# Patient Record
Sex: MALE | Race: BLACK OR AFRICAN AMERICAN
[De-identification: names, ages, dates, MRNs, and addresses within clinical notes are randomized per-mention and may not be internally consistent; named-entity substitution may affect disease eponyms.]

---

## 2018-06-03 ENCOUNTER — HOSPITAL ENCOUNTER (EMERGENCY)
Dept: HOSPITAL 97 - ER | Age: 48
Discharge: HOME | End: 2018-06-03
Payer: COMMERCIAL

## 2018-06-03 DIAGNOSIS — Z88.5: ICD-10-CM

## 2018-06-03 DIAGNOSIS — I10: ICD-10-CM

## 2018-06-03 DIAGNOSIS — G47.30: ICD-10-CM

## 2018-06-03 DIAGNOSIS — Z88.6: ICD-10-CM

## 2018-06-03 DIAGNOSIS — R10.30: Primary | ICD-10-CM

## 2018-06-03 LAB
ALBUMIN SERPL BCP-MCNC: 3.9 G/DL (ref 3.2–5.5)
ALP SERPL-CCNC: 83 IU/L (ref 42–121)
ALT SERPL W P-5'-P-CCNC: 23 IU/L (ref 10–60)
AST SERPL W P-5'-P-CCNC: 20 IU/L (ref 10–42)
BUN BLD-MCNC: 16 MG/DL (ref 6–20)
COHGB MFR BLDA: 0.6 % (ref 0–1.5)
GLUCOSE SERPLBLD-MCNC: 157 MG/DL (ref 65–120)
HCT VFR BLD CALC: 45.8 % (ref 39.6–49)
LIPASE SERPL-CCNC: 30 U/L (ref 22–51)
LYMPHOCYTES # SPEC AUTO: 2.4 K/UL (ref 0.7–4.9)
MCH RBC QN AUTO: 28 PG (ref 27–35)
MCV RBC: 91.3 FL (ref 80–100)
METHAMPHET UR QL SCN: NEGATIVE
OXYHGB MFR BLDA: 95 % (ref 94–97)
PMV BLD: 9.3 FL (ref 7.6–11.3)
POTASSIUM SERPL-SCNC: 4.7 MEQ/L (ref 3.6–5)
RBC # BLD: 5.02 M/UL (ref 4.33–5.43)
SAO2 % BLDA: 96.5 % (ref 92–98.5)
THC SERPL-MCNC: NEGATIVE NG/ML
UA COMPLETE W REFLEX CULTURE PNL UR: (no result)

## 2018-06-03 PROCEDURE — 80307 DRUG TEST PRSMV CHEM ANLYZR: CPT

## 2018-06-03 PROCEDURE — 82805 BLOOD GASES W/O2 SATURATION: CPT

## 2018-06-03 PROCEDURE — 93005 ELECTROCARDIOGRAM TRACING: CPT

## 2018-06-03 PROCEDURE — 82140 ASSAY OF AMMONIA: CPT

## 2018-06-03 PROCEDURE — 83690 ASSAY OF LIPASE: CPT

## 2018-06-03 PROCEDURE — 36415 COLL VENOUS BLD VENIPUNCTURE: CPT

## 2018-06-03 PROCEDURE — 96361 HYDRATE IV INFUSION ADD-ON: CPT

## 2018-06-03 PROCEDURE — 96360 HYDRATION IV INFUSION INIT: CPT

## 2018-06-03 PROCEDURE — 85025 COMPLETE CBC W/AUTO DIFF WBC: CPT

## 2018-06-03 PROCEDURE — 74176 CT ABD & PELVIS W/O CONTRAST: CPT

## 2018-06-03 PROCEDURE — 76377 3D RENDER W/INTRP POSTPROCES: CPT

## 2018-06-03 PROCEDURE — 80076 HEPATIC FUNCTION PANEL: CPT

## 2018-06-03 PROCEDURE — 81003 URINALYSIS AUTO W/O SCOPE: CPT

## 2018-06-03 PROCEDURE — 81015 MICROSCOPIC EXAM OF URINE: CPT

## 2018-06-03 PROCEDURE — 99285 EMERGENCY DEPT VISIT HI MDM: CPT

## 2018-06-03 PROCEDURE — 80048 BASIC METABOLIC PNL TOTAL CA: CPT

## 2018-06-03 NOTE — EDPHYS
Physician Documentation                                                                           

 [facility]                                                                

Name: Reji Miller                                                                       

Age: 47 yrs                                                                                       

Sex: Male                                                                                         

: 1970                                                                                   

MRN: [id number]                                                                                   

Arrival Date: 2018                                                                          

Time: 09:34                                                                                       

Account#: [id number]                                                                            

Bed 20                                                                                            

Private MD:                                                                                       

ED Physician Richard Fernandes                                                                       

Historical:                                                                                       

- Allergies:                                                                                      

                                                                                             

09:37 Morphine;                                                                               sv  

09:37 Dilaudid;                                                                               sv  

- PMHx:                                                                                           

09:37 Hypertension; Borderline DM; Depression; PE; High Cholesterol; GERD;                    sv  

- PSHx:                                                                                           

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  

                                                                                                  

- Immunization history:: Adult Immunizations up to date.                                          

- Social history:: Smoking status: Patient/guardian denies using tobacco.                         

- Ebola Screening: : No symptoms or risks identified at this time.                                

                                                                                                  

                                                                                                  

Exam:                                                                                             

17:00 ECG was reviewed by the Attending Physician.                                              

                                                                                                  

Vital Signs:                                                                                      

09:38 BP 94 / 42; Pulse 57; Resp 16; Temp 98.5; Pulse Ox 93% on R/A; Weight 148.78 kg; Height sv  

      6 ft. 0 in. (182.88 cm); Pain 8/10;                                                         

10:16 BP 97 / 67; Pulse 53; Resp 20; Pulse Ox 96% on 2 lpm NC;                                mh5 

11:45  / 70; Pulse 58; Resp 18; Pulse Ox 99% on 2 lpm NC;                               em  

13:16  / 67; Pulse 49; Resp 16; Pulse Ox 98% on 2 lpm NC; Pain 0/10;                    em  

14:15  / 67; Pulse 52; Resp 16; Pulse Ox 97% on R/A; Pain 0/10;                         em  

09:38 Body Mass Index 44.48 (148.78 kg, 182.88 cm)                                            sv  

09:38 Pt placed on O2 \T\ 2L per NC. O2 sat up to 96%.                                          sv  

                                                                                                  

MDM:                                                                                              

09:57 Patient medically screened.                                                               

14:15 Data reviewed: vital signs, nurses notes. ED course: dr rosario has seen and examined       

      wants pt discharged. pt normotensive, encouraged to see pcp and use cpap machine at         

      home.                                                                                       

                                                                                                  

                                                                                             

10:02 Order name: Basic Metabolic Panel                                                         

                                                                                             

10:02 Order name: CBC with Diff; Complete Time: 11:08                                           

                                                                                             

10:02 Order name: Hepatic Function                                                              

                                                                                             

10:02 Order name: Lipase; Complete Time: 11:08                                                  

                                                                                             

10:02 Order name: Urine Microscopic Only; Complete Time: 14:14                                  

                                                                                             

10:02 Order name: AMMONIA; Complete Time: 11:08                                                 

                                                                                             

10:02 Order name: ABG; Complete Time: 11:08                                                     

                                                                                             

10:02 Order name: Urine Drug Screen; Complete Time: 14:14                                       

                                                                                             

10:03 Order name: CT Stone Protocol; Complete Time: 11:08                                       

                                                                                             

10:03 Order name: Basic Metabolic Panel; Complete Time: 11:08                                 EDMS

                                                                                             

10:03 Order name: Liver (Hepatic) Function; Complete Time: 11:08                              EDMS

                                                                                             

13:49 Order name: Urine Dipstick--Ancillary (enter results)                                   em1 

                                                                                             

13:50 Order name: Urine Dipstick-Ancillary; Complete Time: 14:14                              EDMS

                                                                                             

10:02 Order name: IV Saline Lock; Complete Time: 10:14                                          

                                                                                             

10:02 Order name: Labs collected and sent; Complete Time: 10:14                                 

                                                                                             

10:02 Order name: Urine Dipstick-Ancillary (obtain specimen); Complete Time: 13:46            gs  

                                                                                                  

EC:00 Rate is 55 beats/min. Rhythm is regular. [place] interval is normal. QRS interval is normal. gs  

      QT interval is normal. T waves are Flattened. Clinical impression: NSR w/ Non-specific      

      ST/T Changes. Interpreted by me.                                                            

                                                                                                  

Administered Medications:                                                                         

10:14 Drug: NS 0.9% 1000 ml Route: IV; Rate: 1 bolus; Site: right antecubital;                ss  

13:15 Follow up: IV Status: Completed infusion; IV Intake: 1000ml                             em  

11:32 Drug: NS 0.9% 1000 ml Route: IV; Rate: 1 bolus; Site: right antecubital;                ss  

13:16 Follow up: IV Status: Completed infusion; IV Intake: 1000ml                             em  

13:54 Drug: NS 0.9% 1000 ml Route: IV; Rate: 1 bolus; Site: right antecubital;                em  

14:57 Follow up: IV Status: Completed infusion; IV Intake: 1000ml                             em  

                                                                                                  

                                                                                                  

Disposition:                                                                                      

18 14:17 Discharged to Home. Impression: Lower abdominal pain, unspecified, Sleep apnea.    

- Condition is Stable.                                                                            

- Discharge Instructions: Abdominal Pain, Adult.                                                  

                                                                                                  

- Medication Reconciliation Form, Thank You Letter, Antibiotic Education, Prescription            

  Opioid Use, Work release form form.                                                             

- Follow up: Private Physician; When: 1 - 2 days; Reason: Re-evaluation by your                   

  physician.                                                                                      

                                                                                                  

                                                                                                  

                                                                                                  

Addendum:                                                                                         

2018                                                                                        

     15:52 Addendum: CC: Flank Pain HPI:Onset 2 days ago worse persistent , located Right Flank    g
s

           Associated SS- nausea, weakness, very tired and sleepy. Nothing makes worse or better. 

           Has had in past. PMH: reviewed and agree Soc- ROS-all reviewed and negative PE- 

           gen-awake alert head - atraumatic, no mass Neck-supple no mass ENT- op moist no        

           erythema CV-s cassie no murmur P- lungs clear no increase WOB gi- soft nontender + CVA  

           tender Skin no rash Neuro - sleepy CN,motor,sensory intact and normal DDX-stone, pyleo,

           sleep apnea, medication reaction, encephalopathy. much better seen by medicine will    

           discharge instead of admit pt stable.                                                  

                                                                                                  

Signatures:                                                                                       

Dispatcher [employer]                           Natalia Pereyra RN                    RN   sv                                                   

Felipe Miller, LVN                       LVN  Raeann Llamas RN RN ss Starr, Gregory, MD MD                                                      

                                                                                                  

Corrections: (The following items were deleted from the chart)                                    

                                                                                             

14:15 12:17 Hospitalization Ordered by Fouzia Rosario MD for Inpatient Admission. Preliminary  gs  

      diagnosis is Hypotension due to drugs; Sleep apnea, unspecified. Bed requested for          

      Telemetry/MedSurg (Inpatient). Status is Inpatient Admission. Condition is Stable.          

      Problem is new. Symptoms have improved. UTI on Admission? No. gs                            

15:03 14:17 2018 14:17 Discharged to Home. Impression: Lower abdominal pain,            em  

      unspecified; Sleep apnea. Condition is Stable. Forms are Medication Reconciliation          

      Form, Thank You Letter, Antibiotic Education, Prescription Opioid Use. Follow up:           

      Private Physician; When: 1 - 2 days; Reason: Re-evaluation by your physician. gs            

                                                                                                  

**************************************************************************************************

## 2018-06-03 NOTE — RAD REPORT
EXAM DESCRIPTION:  CT - Stone Protocol - 6/3/2018 10:37 am

 

CLINICAL HISTORY:  Abdominal pain. Right lower quadrant pain for 5 days

 

COMPARISON:  None.

 

TECHNIQUE:  Computed axial tomography of the abdomen pelvis was obtained without oral or IV contrast.
 Lack of IV and oral contrast limits evaluation of solid organs, bowel, and vessels. Coronal reformat
drew images were obtained and reviewed.

 

All CT scans are performed using dose optimization technique as appropriate and may include automated
 exposure control or mA/KV adjustment according to patient size.

 

FINDINGS:  A renal calculus is not seen. An ureteral calculus is not noted. A bladder calculus is not
 present. A 28 millimeter low to intermediate density mass extends off of the upper pole of the right
 kidney. A small left renal cyst is suspected

 

Fatty infiltration liver is present. Mild gallbladder distention

 

Spleen, pancreas and adrenals appear grossly normal

 

There is no evidence of diverticulitis. The appendix appears normal

 

IMPRESSION:  Negative for a genitourinary calculus

 

28 millimeter low to intermediate density mass extending off of the right kidney does not have CT cri
teria for simple cyst. Ultrasound is recommended.

 

Mild gallbladder distention. Ultrasound would be helpful

## 2018-06-03 NOTE — ER
Nurse's Notes                                                                                     

 [facility]                                                                

Name: Reji Miller                                                                       

Age: 47 yrs                                                                                       

Sex: Male                                                                                         

: 1970                                                                                   

MRN: [id number]                                                                                   

Arrival Date: 2018                                                                          

Time: 09:34                                                                                       

Account#: [id number]                                                                            

Bed 20                                                                                            

Private MD:                                                                                       

Diagnosis: Lower abdominal pain, unspecified;Sleep apnea                                          

                                                                                                  

Presentation:                                                                                     

                                                                                             

09:35 Presenting complaint: Patient states: RLQ pain, n/v, diaphoretic started 5 days ago.    sv  

      Was seen in [place] [facility], CT, labs and medication given. Pt appears drowsy. Transition       

      of care: patient was not received from another setting of care. Onset of symptoms was       

      May 29, 2018. Risk Assessment: Do you want to hurt yourself or someone else? Patient        

      reports no desire to harm self or others. Care prior to arrival: None.                      

09:35 Method Of Arrival: Wheelchair                                                           sv  

09:35 Acuity: MILAN 2                                                                           sv  

13:14 Initial Sepsis Screen: Does the patient meet any 2 criteria? No. Patient's initial      em  

      sepsis screen is negative. Does the patient have a suspected source of infection? No.       

      Patient's initial sepsis screen is negative.                                                

                                                                                                  

Historical:                                                                                       

- Allergies:                                                                                      

09:37 Morphine;                                                                               sv  

09:37 Dilaudid;                                                                               sv  

- PMHx:                                                                                           

09:37 Hypertension; Borderline DM; Depression; PE; High Cholesterol; GERD;                    sv  

- PSHx:                                                                                           

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  

                                                                                                  

- Immunization history:: Adult Immunizations up to date.                                          

- Social history:: Smoking status: Patient/guardian denies using tobacco.                         

- Ebola Screening: : No symptoms or risks identified at this time.                                

                                                                                                  

                                                                                                  

Screening:                                                                                        

10:15 Abuse screen: Denies threats or abuse. Denies injuries from another. Nutritional        ss  

      screening: No deficits noted. Tuberculosis screening: Never had TB. Fall Risk No fall       

      in past 12 months (0 pts). Secondary diagnosis (15 points) fatigue. IV access (20           

      points). Gait- Normal/Bed Rest/Wheelchair (0 pts) Mental Status- Oriented to own            

      ability (0 pts).                                                                            

                                                                                                  

Assessment:                                                                                       

10:15 General: Appears comfortable, drowsy. Behavior is cooperative, quiet, Reports fatigue   ss  

      for x 5 days. . Denies fever, chills, Seen for similar symptoms at [facility] days ago, and was discharged home with unspecified abd pain. . Pain:             

      Complains of pain in right lower quadrant Pain currently is 8 out of 10 on a pain           

      scale. Quality of pain is described as tender, Pain began 5 days ago Is intermittent,       

      Aggravated by increased activity, repositioning. Neuro: Level of Consciousness is           

      awake, obeys commands, drowsy. Cardiovascular: Capillary refill < 3 seconds is brisk in     

      bilateral fingers Patient's skin is warm and dry. Pulses are palpable in right radial       

      artery, right dorsalis pedis artery, left radial artery and left dorsalis pedis artery      

      Edema is absent. Rhythm is sinus bradycardia. Respiratory: Airway is patent Trachea         

      midline Respiratory effort is even, unlabored, Respiratory pattern is regular,              

      symmetrical, Breath sounds are clear bilaterally. GI: Reports lower abdominal pain,         

      Patient currently denies bloody stool, diarrhea, nausea, vomiting. : Reports urinary      

      frequency, Denies burning with urination, inability to void. EENT: Nares are clear Oral     

      mucosa is moist. Derm: Skin is intact, is healthy with good turgor, Skin is dry, Skin       

      is pink, warm \T\ dry. normal. Musculoskeletal: Circulation, motion, and sensation          

      intact. Capillary refill < 3 seconds, is brisk, in bilateral fingers. Range of motion:      

      intact in all extremities, Swelling absent.                                                 

10:58 Reassessment: Patient appears in no apparent distress at this time. family and friends  ss  

      at bedside. Pt has been to CT scan, awaiting results.                                       

12:36 Reassessment: Dr. Medina at bedside, updating patient on POC.                            ss  

13:14 Reassessment: Patient appears in no apparent distress at this time. Patient and/or      em  

      family updated on plan of care and expected duration. Pain level reassessed. Patient is     

      alert, oriented x 3, equal unlabored respirations, skin warm/dry/pink.                      

14:15 Reassessment: Patient appears in no apparent distress at this time. Patient and/or      em  

      family updated on plan of care and expected duration. Pain level reassessed. Patient is     

      alert, oriented x 3, equal unlabored respirations, skin warm/dry/pink. Patient denies       

      pain at this time. Patient states feeling better.                                           

                                                                                                  

Vital Signs:                                                                                      

09:38 BP 94 / 42; Pulse 57; Resp 16; Temp 98.5; Pulse Ox 93% on R/A; Weight 148.78 kg; Height sv  

      6 ft. 0 in. (182.88 cm); Pain 8/10;                                                         

10:16 BP 97 / 67; Pulse 53; Resp 20; Pulse Ox 96% on 2 lpm NC;                                mh5 

11:45  / 70; Pulse 58; Resp 18; Pulse Ox 99% on 2 lpm NC;                               em  

13:16  / 67; Pulse 49; Resp 16; Pulse Ox 98% on 2 lpm NC; Pain 0/10;                    em  

14:15  / 67; Pulse 52; Resp 16; Pulse Ox 97% on R/A; Pain 0/10;                         em  

09:38 Body Mass Index 44.48 (148.78 kg, 182.88 cm)                                            sv  

09:38 Pt placed on O2 \T\ 2L per NC. O2 sat up to 96%.                                          sv  

                                                                                                  

ED Course:                                                                                        

09:34 Patient arrived in ED.                                                                  sb2 

09:36 Triage completed.                                                                       sv  

09:40 Arm band placed on right wrist. Patient placed in an exam room, on a stretcher, on      sv  

      oxygen.                                                                                     

09:47 Richard Fernandes MD is Attending Physician.                                              gs  

09:59 EKG done, by ED staff, reviewed by Richard Fernandes MD.                                    mh5 

10:01 Patient has correct armband on for positive identification. Placed in gown. Bed in low  mh5 

      position. Call light in reach. Side rails up X 1. Adult w/ patient. Warm blanket given.     

      Cardiac monitor on. Pulse ox on. NIBP on.                                                   

10:06 Inserted saline lock: 20 gauge in right antecubital area, using aseptic technique.      ss  

      Blood collected.                                                                            

10:14 Miller, Felipe, LVN is Primary Nurse.                                                     em  

10:37 CT completed. Patient moved to CT via stretcher. Patient moved back from CT.            [id number] 

10:37 CT Stone Protocol In Process Unspecified.                                               EDMS

12:16 Fouzia Medina MD is Hospitalizing Provider.                                           gs  

13:14 No provider procedures requiring assistance completed.                                  em  

13:51 Urine Drug Screen Sent.                                                                 mh5 

15:00 IV discontinued, intact, bleeding controlled, No redness/swelling at site. Pressure     em  

      dressing applied.                                                                           

                                                                                                  

Administered Medications:                                                                         

10:14 Drug: NS 0.9% 1000 ml Route: IV; Rate: 1 bolus; Site: right antecubital;                ss  

13:15 Follow up: IV Status: Completed infusion; IV Intake: 1000ml                             em  

11:32 Drug: NS 0.9% 1000 ml Route: IV; Rate: 1 bolus; Site: right antecubital;                ss  

13:16 Follow up: IV Status: Completed infusion; IV Intake: 1000ml                             em  

13:54 Drug: NS 0.9% 1000 ml Route: IV; Rate: 1 bolus; Site: right antecubital;                em  

14:57 Follow up: IV Status: Completed infusion; IV Intake: 1000ml                             em  

                                                                                                  

                                                                                                  

Intake:                                                                                           

13:15 IV: 1000ml; Total: 1000ml.                                                              em  

13:16 IV: 1000ml; Total: 2000ml.                                                              em  

14:57 IV: 1000ml; Total: 3000ml.                                                              em  

                                                                                                  

Outcome:                                                                                          

12:17 Decision to Hospitalize by Provider.                                                    gs  

14:17 Discharge ordered by MD.                                                                gs  

15:00 Discharged to home ambulatory.                                                          em  

15:00 Condition: good                                                                             

15:00 Discharge instructions given to patient, Instructed on discharge instructions, follow       

      up and referral plans. Demonstrated understanding of instructions, follow-up care.          

15:03 Patient left the ED.                                                                    em  

                                                                                                  

Signatures:                                                                                       

Dispatcher [employer]                           Natalia Pereyra RN RN                                                      

Felipe Miller, LVN                       LVN  em                                                   

Raeann Coello RN RN ss Woodley, Crystal                             1                                                  

Macey Hayes                              5                                                  

Richard Fernandes MD MD gs Billeau, Sheri                               sb2                                                  

                                                                                                  

Corrections: (The following items were deleted from the chart)                                    

09:38 09:35 Acuity: MILAN 3 sv                                                                  sv  

09:48 09:35 Presenting complaint: Patient states: RLQ pain, n/v, diaphoretic started 5 days   sv  

      ago. Was seen in [facility], CT, labs and medication given. sv                             

                                                                                                  

**************************************************************************************************

## 2018-06-04 NOTE — EKG
Test Date:    2018-06-03               Test Time:    09:56:09

Technician:   WAYLON                                    

                                                     

MEASUREMENT RESULTS:                                       

Intervals:                                           

Rate:         55                                     

AR:           184                                    

QRSD:         96                                     

QT:           410                                    

QTc:          392                                    

Axis:                                                

P:            9                                      

AR:           184                                    

QRS:          31                                     

T:            19                                     

                                                     

INTERPRETIVE STATEMENTS:                                       

                                                     

Sinus bradycardia with occasional premature supraventricular complexes

Nonspecific ST abnormality

Abnormal ECG

No previous ECG available for comparison



Electronically Signed On 06-04-18 07:37:46 CDT by Guzman Mcbride

## 2018-07-15 ENCOUNTER — HOSPITAL ENCOUNTER (OUTPATIENT)
Dept: HOSPITAL 97 - ER | Age: 48
Setting detail: OBSERVATION
LOS: 1 days | Discharge: HOME | End: 2018-07-16
Attending: FAMILY MEDICINE | Admitting: FAMILY MEDICINE
Payer: COMMERCIAL

## 2018-07-15 DIAGNOSIS — F41.9: ICD-10-CM

## 2018-07-15 DIAGNOSIS — I10: ICD-10-CM

## 2018-07-15 DIAGNOSIS — R07.9: Primary | ICD-10-CM

## 2018-07-15 DIAGNOSIS — Z21: ICD-10-CM

## 2018-07-15 DIAGNOSIS — E78.5: ICD-10-CM

## 2018-07-15 DIAGNOSIS — Z86.711: ICD-10-CM

## 2018-07-15 DIAGNOSIS — F17.210: ICD-10-CM

## 2018-07-15 DIAGNOSIS — E66.01: ICD-10-CM

## 2018-07-15 LAB
ALBUMIN SERPL BCP-MCNC: 3.7 G/DL (ref 3.4–5)
ALP SERPL-CCNC: 97 U/L (ref 45–117)
ALT SERPL W P-5'-P-CCNC: 31 U/L (ref 12–78)
AST SERPL W P-5'-P-CCNC: 21 U/L (ref 15–37)
BUN BLD-MCNC: 17 MG/DL (ref 7–18)
CKMB CREATINE KINASE MB: 4.6 NG/ML (ref 0.3–3.6)
GLUCOSE SERPLBLD-MCNC: 143 MG/DL (ref 74–106)
HCT VFR BLD CALC: 44.2 % (ref 39.6–49)
HDLC SERPL-MCNC: 18 MG/DL (ref 40–60)
INR BLD: 1.07
LDLC SERPL CALC-MCNC: 88 MG/DL (ref ?–130)
LYMPHOCYTES # SPEC AUTO: 3.1 K/UL (ref 0.7–4.9)
MAGNESIUM SERPL-MCNC: 2.1 MG/DL (ref 1.8–2.4)
MCH RBC QN AUTO: 29.4 PG (ref 27–35)
MCV RBC: 91.1 FL (ref 80–100)
NT-PROBNP SERPL-MCNC: 50 PG/ML (ref ?–125)
PMV BLD: 8.9 FL (ref 7.6–11.3)
POTASSIUM SERPL-SCNC: 3.6 MMOL/L (ref 3.5–5.1)
RBC # BLD: 4.85 M/UL (ref 4.33–5.43)

## 2018-07-15 PROCEDURE — 84484 ASSAY OF TROPONIN QUANT: CPT

## 2018-07-15 PROCEDURE — 71045 X-RAY EXAM CHEST 1 VIEW: CPT

## 2018-07-15 PROCEDURE — 82962 GLUCOSE BLOOD TEST: CPT

## 2018-07-15 PROCEDURE — 81003 URINALYSIS AUTO W/O SCOPE: CPT

## 2018-07-15 PROCEDURE — 82553 CREATINE MB FRACTION: CPT

## 2018-07-15 PROCEDURE — 96375 TX/PRO/DX INJ NEW DRUG ADDON: CPT

## 2018-07-15 PROCEDURE — 83880 ASSAY OF NATRIURETIC PEPTIDE: CPT

## 2018-07-15 PROCEDURE — 78452 HT MUSCLE IMAGE SPECT MULT: CPT

## 2018-07-15 PROCEDURE — 85610 PROTHROMBIN TIME: CPT

## 2018-07-15 PROCEDURE — 93306 TTE W/DOPPLER COMPLETE: CPT

## 2018-07-15 PROCEDURE — 93005 ELECTROCARDIOGRAM TRACING: CPT

## 2018-07-15 PROCEDURE — 85025 COMPLETE CBC W/AUTO DIFF WBC: CPT

## 2018-07-15 PROCEDURE — 80076 HEPATIC FUNCTION PANEL: CPT

## 2018-07-15 PROCEDURE — 96374 THER/PROPH/DIAG INJ IV PUSH: CPT

## 2018-07-15 PROCEDURE — 93017 CV STRESS TEST TRACING ONLY: CPT

## 2018-07-15 PROCEDURE — 36415 COLL VENOUS BLD VENIPUNCTURE: CPT

## 2018-07-15 PROCEDURE — 99285 EMERGENCY DEPT VISIT HI MDM: CPT

## 2018-07-15 PROCEDURE — 80061 LIPID PANEL: CPT

## 2018-07-15 PROCEDURE — 83735 ASSAY OF MAGNESIUM: CPT

## 2018-07-15 PROCEDURE — 85730 THROMBOPLASTIN TIME PARTIAL: CPT

## 2018-07-15 PROCEDURE — 80048 BASIC METABOLIC PNL TOTAL CA: CPT

## 2018-07-15 RX ADMIN — HUMAN INSULIN SCH: 100 INJECTION, SOLUTION SUBCUTANEOUS at 11:37

## 2018-07-15 RX ADMIN — GEMFIBROZIL SCH MG: 600 TABLET, FILM COATED ORAL at 20:43

## 2018-07-15 RX ADMIN — HUMAN INSULIN SCH: 100 INJECTION, SOLUTION SUBCUTANEOUS at 16:30

## 2018-07-15 RX ADMIN — HUMAN INSULIN SCH: 100 INJECTION, SOLUTION SUBCUTANEOUS at 20:37

## 2018-07-15 RX ADMIN — BUSPIRONE HYDROCHLORIDE SCH MG: 15 TABLET ORAL at 20:43

## 2018-07-15 RX ADMIN — CARVEDILOL SCH MG: 12.5 TABLET, FILM COATED ORAL at 20:44

## 2018-07-15 RX ADMIN — Medication SCH ML: at 20:45

## 2018-07-15 RX ADMIN — BUSPIRONE HYDROCHLORIDE SCH MG: 15 TABLET ORAL at 14:00

## 2018-07-15 NOTE — ER
Nurse's Notes                                                                                     

 [facility]                                                                

Name: Reji Espino                                                                       

Age: 47 yrs                                                                                       

Sex: Male                                                                                         

: 1970                                                                                   

MRN: [id number]                                                                                   

Arrival Date: 07/15/2018                                                                          

Time: 08:11                                                                                       

Account#: [id number]                                                                            

Bed 4                                                                                             

Private MD:                                                                                       

Diagnosis: Chest pain, unspecified                                                                

                                                                                                  

Presentation:                                                                                     

07/15                                                                                             

08:14 Presenting complaint: Patient states: chest tightness and shortness of breath that      ss  

      began when patient got off of work yesterday. Transition of care: patient was not           

      received from another setting of care. Onset of symptoms was 2018. Risk            

      Assessment: Do you want to hurt yourself or someone else? Patient reports no desire to      

      harm self or others. Initial Sepsis Screen: Does the patient meet any 2 criteria? RR >      

      20 per min. Does the patient have a suspected source of infection? No. Patient's            

      initial sepsis screen is negative. Care prior to arrival: None.                             

08:14 Method Of Arrival: Ambulatory                                                             

08:14 Acuity: MILAN 3                                                                           ss  

                                                                                                  

Historical:                                                                                       

- Allergies:                                                                                      

08:16 Dilaudid;                                                                               ss  

08:16 Morphine;                                                                               ss  

- Home Meds:                                                                                      

10:55 tramadol 50 mg Oral tab 1 tab every 6 hours [Active]; Xarelto 20 mg oral tab 1 tab once ph  

      daily [Active]; fluoxetine 40 mg Oral cap 1 cap once daily [Active]; clonidine HCl 0.2      

      mg Oral tab 1 tab 2 times per day [Active]; gemfibrozil 600 mg Oral tab 1 tab 2 times       

      per day [Active]; Lotrel 10-40 mg Oral cap 1 cap once daily [Active]; carvedilol 12.5       

      mg oral tab 1 tab 2 times per day [Active]; omeprazole 20 mg Oral cpDR 1 cap once daily     

      [Active]; ziprasidone HCl 40 mg oral cap 1 cap nightly [Active]; alprazolam 0.25 mg         

      Oral tab 1 tab nightly [Active]; folic acid 2mg Oral tab [Active]; buspirone 15 mg Oral     

      tab 1 tab three times a day [Active];                                                       

- PMHx:                                                                                           

08:16 borderline DM; Depression; GERD; High Cholesterol; Hypertension; PE;                    ss  

- PSHx:                                                                                           

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  

                                                                                                  

- Immunization history:: Adult Immunizations up to date.                                          

- Social history:: Smoking status: Patient uses tobacco products, "I vape for the                 

  taste, but not for nicotine" , Patient/guardian denies using alcohol, street drugs,             

  The patient lives with family, .                                                                

- Ebola Screening: : Patient denies exposure to infectious person Patient denies travel           

  to an Ebola-affected area in the 21 days before illness onset.                                  

- Family history:: not pertinent.                                                                 

                                                                                                  

                                                                                                  

Screenin:48 Abuse screen: Denies threats or abuse. Denies injuries from another. Nutritional        ph  

      screening: No deficits noted. Tuberculosis screening: No symptoms or risk factors           

      identified. Fall Risk None identified.                                                      

                                                                                                  

Assessment:                                                                                       

08:50 General: Appears in no apparent distress. uncomfortable, obese, well groomed, Behavior  ph  

      is cooperative, appropriate for age, anxious, Denies fever, feeling ill. Pain:              

      Complains of pain in mid-sternal area Pain does not radiate. Pain currently is 6 out of     

      10 on a pain scale. Quality of pain is described as pressure, Pain began "last night".      

      Neuro: Level of Consciousness is awake, alert, obeys commands, Oriented to person,          

      place, time, situation, Reports headache frontal area, Denies weakness dizziness.           

      Cardiovascular: Reports chest pain, nausea, shortness of breath, Denies palpitations,       

      syncope, vomiting, Capillary refill < 3 seconds in bilateral fingers Patient's skin is      

      warm and dry. Rhythm is sinus rhythm with unifocal PVCs Chest pain quality is pressure,     

      is located in substernal area. Respiratory: Reports shortness of breath at rest Airway      

      is patent Respiratory effort is even, unlabored, Respiratory pattern is tachypnea           

      Breath sounds are clear bilaterally. Denies cough. GI: Reports nausea, Patient              

      currently denies abdominal pain, vomiting. Derm: Skin is intact, is healthy with good       

      turgor, Skin is pink, warm \T\ dry. Musculoskeletal: Circulation, motion, and sensation     

      intact. Range of motion: intact in all extremities.                                         

09:45 Reassessment: Patient appears in no apparent distress at this time. Patient and/or      ph  

      family updated on plan of care and expected duration. Pain level reassessed. Patient is     

      alert, oriented x 3, equal unlabored respirations, skin warm/dry/pink.                      

10:43 Reassessment: Patient appears in no apparent distress at this time. Patient and/or      ph  

      family updated on plan of care and expected duration. Pain level reassessed. Patient is     

      alert, oriented x 3, equal unlabored respirations, skin warm/dry/pink. Pt resting           

      quietly, awaiting room assignment, VSS.                                                     

                                                                                                  

Vital Signs:                                                                                      

08:16  / 114; Pulse 77; Resp 21; Temp 97.9(O); Pulse Ox 99% on R/A; Weight 142.43 kg;   ss  

      Height 6 ft. 0 in. (182.88 cm); Pain 6/10;                                                  

08:48  / 101; Pulse 71; Resp 18; Pulse Ox 96% on R/A; Pain 6/10;                        ph  

09:22  / 93; Pulse 69; Resp 24; Pulse Ox 100% ;                                         sv  

10:40  / 90; Pulse 59; Resp 18; Pulse Ox 98% on R/A;                                    ph  

08:16 Body Mass Index 42.59 (142.43 kg, 182.88 cm)                                              

                                                                                                  

ED Course:                                                                                        

08:11 Patient arrived in ED.                                                                  ss  

08:12 Raina Day MD is Attending Physician.                                           ma2 

08:16 Triage completed.                                                                       ss  

08:16 Arm band placed on right wrist.                                                         ss  

08:30 Initial lab(s) drawn, by me, sent to lab. Inserted saline lock: 22 gauge in right       ph  

      antecubital area, using aseptic technique. Blood collected.                                 

08:34 Aletha Smith, RN is Primary Nurse.                                                    ph  

08:55 Patient has correct armband on for positive identification. Placed in gown. Bed in low  ph  

      position. Call light in reach. Side rails up X 1. Cardiac monitor on. Pulse ox on. NIBP     

      on. Warm blanket given.                                                                     

09:01 XRAY Chest (1 view) In Process Unspecified.                                             EDMS

09:53 No provider procedures requiring assistance completed.                                  ph  

09:58 Fouzia Medina MD is Hospitalizing Provider.                                           ma2 

15:57 Patient admitted, IV remains in place.                                                  ph  

                                                                                                  

Administered Medications:                                                                         

08:46 Drug: Aspirin 325 mg Route: PO;                                                         ph  

10:42 Follow up: Response: No adverse reaction                                                ph  

08:47 Drug: Zofran 4 mg Route: IVP; Site: right antecubital;                                  ph  

10:41 Follow up: Response: No adverse reaction                                                ph  

08:47 Drug: Motrin 800 mg Route: PO;                                                          ph  

10:41 Follow up: Response: No adverse reaction                                                ph  

09:48 Drug: Zofran 4 mg Route: IVP; Site: right antecubital;                                  sv  

10:41 Follow up: Response: No adverse reaction                                                ph  

09:51 CANCELLED (pt allergic): morphine 4 mg IVP once                                         sv  

09:51 Drug: TORadol 30 mg Route: IVP; Site: right antecubital;                                sv  

10:41 Follow up: Response: No adverse reaction                                                ph  

                                                                                                  

                                                                                                  

Outcome:                                                                                          

09:58 Decision to Hospitalize by Provider.                                                    ma2 

11:49 Patient left the ED.                                                                    sv  

11:49 Admitted to Tele accompanied by tech, via wheelchair, with chart.                       ph  

11:49 Condition: stable                                                                           

11:49 Instructed on the need for admit.                                                           

                                                                                                  

Signatures:                                                                                       

Dispatcher [employer]                           Natalia Pereyra RN RN sv Smirch, Shelby, RN RN ss Hall, Patricia, RN RN ph Alzahri, Mohammad, MD MD   ma2                                                  

                                                                                                  

**************************************************************************************************

## 2018-07-15 NOTE — RAD REPORT
EXAM DESCRIPTION:  [place] Single View7/15/2018 9:03 am

 

CLINICAL HISTORY:  Chest pain

 

COMPARISON:  none

 

FINDINGS:   The lungs appear clear of acute infiltrate. The heart is mildly enlarged

 

IMPRESSION:   No acute abnormalities displayed

## 2018-07-15 NOTE — EDPHYS
Physician Documentation                                                                           

 [facility]                                                                

Name: Reji Espino                                                                       

Age: 47 yrs                                                                                       

Sex: Male                                                                                         

: 1970                                                                                   

MRN: [id number]                                                                                   

Arrival Date: 07/15/2018                                                                          

Time: 08:11                                                                                       

Account#: [id number]                                                                            

Bed 4                                                                                             

Private MD:                                                                                       

ED Physician Raina Day                                                                    

HPI:                                                                                              

07/15                                                                                             

08:22 This 47 yrs old Black Male presents to ER via Ambulatory with complaints of Shortness   ma2 

      Of Breath, Chest Tightness.                                                                 

08:22 The patient has shortness of breath at rest. Onset: The symptoms/episode began/occurred ma2 

      gradually, 12 hour(s) ago. Duration: The symptoms are continuous. The patient's             

      shortness of breath has no apparent modifying factors. Associated signs and symptoms:       

      Pertinent negatives:. Severity of symptoms: At their worst the symptoms were moderate.      

      The patient has experienced similar episodes in the past. hx of PE on zarelto, has HTN,     

      DM, HLD here with chest pain that is constant for 12 hrs, has had this pain before, had     

      negative stress test > 5 yrs back.                                                          

                                                                                                  

Historical:                                                                                       

- Allergies:                                                                                      

08:16 Dilaudid;                                                                               ss  

08:16 Morphine;                                                                               ss  

- Home Meds:                                                                                      

10:55 tramadol 50 mg Oral tab 1 tab every 6 hours [Active]; Xarelto 20 mg oral tab 1 tab once ph  

      daily [Active]; fluoxetine 40 mg Oral cap 1 cap once daily [Active]; clonidine HCl 0.2      

      mg Oral tab 1 tab 2 times per day [Active]; gemfibrozil 600 mg Oral tab 1 tab 2 times       

      per day [Active]; Lotrel 10-40 mg Oral cap 1 cap once daily [Active]; carvedilol 12.5       

      mg oral tab 1 tab 2 times per day [Active]; omeprazole 20 mg Oral cpDR 1 cap once daily     

      [Active]; ziprasidone HCl 40 mg oral cap 1 cap nightly [Active]; alprazolam 0.25 mg         

      Oral tab 1 tab nightly [Active]; folic acid 2mg Oral tab [Active]; buspirone 15 mg Oral     

      tab 1 tab three times a day [Active];                                                       

- PMHx:                                                                                           

08:16 borderline DM; Depression; GERD; High Cholesterol; Hypertension; PE;                    ss  

- PSHx:                                                                                           

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  

                                                                                                  

- Immunization history:: Adult Immunizations up to date.                                          

- Social history:: Smoking status: Patient uses tobacco products, "I vape for the                 

  taste, but not for nicotine" , Patient/guardian denies using alcohol, street drugs,             

  The patient lives with family, .                                                                

- Ebola Screening: : Patient denies exposure to infectious person Patient denies travel           

  to an Ebola-affected area in the 21 days before illness onset.                                  

- Family history:: not pertinent.                                                                 

                                                                                                  

                                                                                                  

ROS:                                                                                              

08:22 Constitutional: Negative for fever, chills, and weight loss, Eyes: Negative for injury, ma2 

      pain, redness, and discharge.                                                               

08:22 Cardiovascular: Positive for chest pain.                                                    

08:22 All other systems are negative.                                                             

                                                                                                  

Exam:                                                                                             

08:22 Constitutional:  This is a well developed, well nourished patient who is awake, alert,  ma2 

      and in no acute distress. Head/Face:  Normocephalic, atraumatic. Chest/axilla:  Normal      

      chest wall appearance and motion.  Nontender with no deformity.  No lesions are             

      appreciated. Cardiovascular:  Regular rate and rhythm with a normal S1 and S2.  No          

      gallops, murmurs, or rubs.  Normal PMI, no JVD.  No pulse deficits. Respiratory:  Lungs     

      have equal breath sounds bilaterally, clear to auscultation and percussion.  No rales,      

      rhonchi or wheezes noted.  No increased work of breathing, no retractions or nasal          

      flaring. Abdomen/GI:  Soft, non-tender, with normal bowel sounds.  No distension or         

      tympany.  No guarding or rebound.  No evidence of tenderness throughout.                    

                                                                                                  

Vital Signs:                                                                                      

08:16  / 114; Pulse 77; Resp 21; Temp 97.9(O); Pulse Ox 99% on R/A; Weight 142.43 kg;   ss  

      Height 6 ft. 0 in. (182.88 cm); Pain 6/10;                                                  

08:48  / 101; Pulse 71; Resp 18; Pulse Ox 96% on R/A; Pain 6/10;                        ph  

09:22  / 93; Pulse 69; Resp 24; Pulse Ox 100% ;                                         sv  

10:40  / 90; Pulse 59; Resp 18; Pulse Ox 98% on R/A;                                    ph  

08:16 Body Mass Index 42.59 (142.43 kg, 182.88 cm)                                              

                                                                                                  

MDM:                                                                                              

08:12 Patient medically screened.                                                             ma2 

08:22 Differential diagnosis: Anxiety Reaction asthma, Myocardial Infarction pneumonia,       ma2 

      Pneumothorax Pulmonary Embolism. Data reviewed: vital signs, nurses notes, EMS record,      

      EKG. Data reviewed: EKG with no STEMI, has RBBB sinus rhythm no st changes, he was          

      advised to see his cardiologist for stress test and he did not. other likely DD             

      includes PE, given he is on xarelto and having respiratory distress, with spo2 of 100       

      on RA willl not do PE diagnostic workup in ER as it will not change ER management .         

      Counseling: I had a detailed discussion with the patient and/or guardian regarding: the     

      historical points, exam findings, and any diagnostic results supporting the                 

      discharge/admit diagnosis, the presence of at least one elevated blood pressure reading     

      (>120/80) during this emergency department visit, the need for further work-up and          

      treatment in the hospital.                                                                  

09:53 Test interpretation: by ED physician or midlevel provider: ECG, plain radiologic        ma2 

      studies. Response to treatment: the patient's symptoms have mildly improved after           

      treatment. Admission orders: after a detailed discussion of the patient's condition and     

      case, the admit orders are written by me. ED course: discussed with dr. rosario .             

                                                                                                  

07/15                                                                                             

08:22 Order name: Basic Metabolic Panel; Complete Time: 09:59                                 ma2 

07/15                                                                                             

08:22 Order name: CBC with Diff; Complete Time: 08:49                                         ma2 

07/15                                                                                             

08:22 Order name: Ckmb; Complete Time: 09:59                                                  ma2 

07/15                                                                                             

08:22 Order name: LFT's; Complete Time: 09:59                                                 ma2 

07/15                                                                                             

08:22 Order name: Magnesium; Complete Time: 09:59                                             ma2 

07/15                                                                                             

08:22 Order name: NT PRO-BNP; Complete Time: 09:59                                            ma2 

07/15                                                                                             

08:22 Order name: PT-INR; Complete Time: 09:07                                                ma2 

07/15                                                                                             

08:22 Order name: Ptt, Activated; Complete Time: 09:07                                        ma2 

07/15                                                                                             

08:22 Order name: Troponin (emerg Dept Use Only); Complete Time: 09:                        ma2 

07/15                                                                                             

10:12 Order name: Urine Dipstick--Ancillary (enter results)                                     

07/15                                                                                             

10:17 Order name: Basic Metabolic Panel                                                       [facility]

07/15                                                                                             

10:17 Order name: CBC with Automated Diff                                                     [facility]

07/15                                                                                             

10:17 Order name: Troponin I                                                                  [facility]

07/15                                                                                             

10:17 Order name: Troponin I                                                                  [facility]

07/15                                                                                             

08:22 Order name: XRAY Chest (1 view)                                                         ma2 

07/15                                                                                             

08:22 Order name: EKG; Complete Time: 08:22                                                   ma2 

07/15                                                                                             

08:22 Order name: Cardiac monitoring; Complete Time: 08:35                                    ma2 

07/15                                                                                             

08:22 Order name: EKG - Nurse/Tech; Complete Time: 08:35                                      ma2 

07/15                                                                                             

10:17 Order name: Heart Healthy                                                               [facility]

07/15                                                                                             

10:17 Order name: EKG Electrocardiogram                                                       [facility]

07/15                                                                                             

10:17 Order name: EKG Electrocardiogram                                                       [facility]

07/15                                                                                             

10:17 Order name: EKG Electrocardiogram                                                       [facility]

07/15                                                                                             

10:17 Order name: Troponin I                                                                  [facility]

07/15                                                                                             

10:17 Order name: Troponin I                                                                  [facility]

07/15                                                                                             

08:22 Order name: IV Saline Lock; Complete Time: 08:35                                        ma2 

07/15                                                                                             

08:22 Order name: Labs collected and sent; Complete Time: 08:35                               ma2 

07/15                                                                                             

08:22 Order name: O2 Per Protocol; Complete Time: 08:35                                       ma2 

07/15                                                                                             

08:22 Order name: O2 Sat Monitoring; Complete Time: 08:35                                     ma2 

07/15                                                                                             

08:22 Order name: Urine Dipstick-Ancillary (obtain specimen); Complete Time: 10:10            ma2 

                                                                                                  

Administered Medications:                                                                         

08:46 Drug: Aspirin 325 mg Route: PO;                                                         ph  

10:42 Follow up: Response: No adverse reaction                                                ph  

08:47 Drug: Zofran 4 mg Route: IVP; Site: right antecubital;                                  ph  

10:41 Follow up: Response: No adverse reaction                                                ph  

08:47 Drug: Motrin 800 mg Route: PO;                                                          ph  

10:41 Follow up: Response: No adverse reaction                                                ph  

09:48 Drug: Zofran 4 mg Route: IVP; Site: right antecubital;                                  sv  

10:41 Follow up: Response: No adverse reaction                                                ph  

09:51 CANCELLED (pt allergic): morphine 4 mg IVP once                                         sv  

09:51 Drug: TORadol 30 mg Route: IVP; Site: right antecubital;                                sv  

10:41 Follow up: Response: No adverse reaction                                                ph  

                                                                                                  

                                                                                                  

Disposition:                                                                                      

07/15/18 09:58 Hospitalization ordered by Fouzia Rosario for Observation. Preliminary             

  diagnosis is Chest pain, unspecified.                                                           

- Bed requested for Telemetry/MedSurg (observation).                                              

- Status is Observation.                                                                      sv  

- Condition is Stable.                                                                            

- Problem is new.                                                                                 

- Symptoms are unchanged.                                                                         

UTI on Admission? No                                                                              

                                                                                                  

                                                                                                  

                                                                                                  

Signatures:                                                                                       

Dispatcher [employer]                           Natalia Pereyra, RN                    RN                                                      

Raeann Coello, RUBY                      RN                                                      

Aletha Smith RN RN                                                      

Raina Day MD MD   [facility]                                                  

Ashley Tiwari                                                                              

                                                                                                  

Corrections: (The following items were deleted from the chart)                                    

09:51 09:41 morphine 4 mg IVP once ordered. [facility]  

10:55 09:58 Hospitalization Ordered by Fouzia Rosario MD for Observation. Preliminary          eb  

      diagnosis is Chest pain, unspecified. Bed requested for Telemetry/MedSurg                   

      (observation). Status is Observation. Condition is Stable. Problem is new. Symptoms are     

      unchanged. UTI on Admission? No. ma2                                                        

11:49 10:55 07/15/2018 09:58 Hospitalization Ordered by Fouzia Rosario MD for Observation.     sv  

      Preliminary diagnosis is Chest pain, unspecified. Bed requested for Telemetry/MedSurg       

      (observation). Status is Observation. Condition is Stable. Problem is new. Symptoms are     

      unchanged. UTI on Admission? No. eb                                                         

                                                                                                  

**************************************************************************************************

## 2018-07-15 NOTE — XMS REPORT
Clinical Summary

 Created on:July 15, 2018



Patient:Reji Garcia

Sex:Male

:1970

External Reference #:[id number]





Demographics







 Address  [address]



   [place] [address]

 

 Home Phone  [phone]

 

 Work Phone  [phone]

 

 Mobile Phone  [phone]

 

 Email Address  [email or web address]

 

 Preferred Language  English

 

 Marital Status  

 

 Buddhism Affiliation  Unknown

 

 Race  Black or 

 

 Ethnic Group  Not  or 









Author







 Organization  [facility]

 

 Address  [address] [place] [address]









Support







 Name  Relationship  Address  Phone

 

 Federico Espino Jr  Spouse  [address]   [phone]



     [place] [address]  









Care Team Providers







 Name  Role  Phone

 

 Son Desai DO  Primary Care Provider  [phone]









Allergies







 Active Allergy  Reactions  Severity  Noted Date  Comments

 

 Milk  Diarrhea, GI Intolerance, Other (See      



   Comments)      

 

 Morphine      2016  







Current Medications







 Prescription  Sig.  Disp.  Refills  Start Date  End Date  Status

 

 ALPRAZolam (XANAX)        2016    Active



 0.25 MG tablet            

 

 amlodipine-benazepri        2016    Active



 l (LOTREL) 10-40 mg            



 per capsule            

 

 carvedilol (COREG)        07/10/2016    Active



 12.5 MG tablet            

 

 clonIDINE (CATAPRES)        07/10/2016    Active



 0.1 MG tablet            

 

 FLUoxetine (PROzac)        2016    Active



 40 MG capsule            

 

 XARELTO tablet        2016    Active

 

 clonIDINE HCl        10/12/2016    Active



 (CATAPRES) 0.2 MG            



 tablet            

 

 nitroglycerin        2016    Active



 (NITROSTAT) 0.4 MG            



 SL tablet            

 

 traMADol (ULTRAM) 50        10/12/2016    Active



 mg tablet            

 

 FOLIC ACID ORAL  Take 2 mg by          Active



   mouth.          

 

 busPIRone (BUSPAR)  Take 15 mg by          Active



 15 MG tablet  mouth 3          



   (three) times          



   a day.          

 

 gemfibrozil (LOPID)  Take 1 tablet      2017    Active



 600 MG tablet  by mouth          



   daily.          

 

 omeprazole  Take 1      2017    Active



 (PriLOSEC) 20 MG  capsule by          



 capsule  mouth daily.          

 

 folic acid (FOLVITE)        2018    Active



 1 MG tablet            

 

 ATRIPLA 600-200-300  Take 1 tablet  90 tablet  3  2018  
Active



 mg per tablet  by mouth          



   nightly.          

 

 AFLURIA [id number],        2016  Discontinued



 PF, 45 mcg (15 mcg x            



 3)/0.5 mL syringe            

 

 PNEUMOVAX 23 25        2016  Discontinued



 mcg/0.5 mL syringe            



 vaccine            

 

 ATRIPLA 600-200-300  Take 1 tablet  90 tablet  3  02/15/2017  2018  
Discontinued



 mg per tablet  by mouth          



   nightly.          







Active Problems







 Problem  Noted Date

 

 Human immunodeficiency virus (HIV) disease  2016

 

 Uncontrolled hypertension  2016

 

 Hyperlipidemia  2016

 

 Adult onset  2016







Encounters







 Date  Type  Specialty  Care Team  Description

 

 2018  Refill  Infectious Diseases  Pedro Huggins MA  

 

 2018  Refill  Infectious Diseases  Keena Warner MD  

 

 2018  Office Visit  Infectious Diseases  Keena Warner  Human 
immunodeficiency virus I infection (Primary Dx);



       MD JOSE  Long term use of drug

 

 2017  Hospital Encounter  Gastroenterology  Blayne Vee MD  

 

 2017  Procedure Pass  Gastroenterology    

 

 2017  Surgery  Gastroenterology  Mariusz,  COLONOSCOPY



       Blayne HENRY MD  

 

 2017  Anesthesia Event  Gastroenterology  Gabi Weiss FNP  



after 2017



Family History







 Medical History  Relation  Name  Comments

 

 Cancer  Maternal Grandfather    

 

 Cancer  Mother    









 Relation  Name  Status  Comments

 

 Maternal Grandfather      

 

 Mother      







Social History







 Tobacco Use  Types  Packs/Day  Years Used  Date

 

 Never Smoker        









 Smokeless Tobacco: Never Used      









 Alcohol Use  Drinks/Week  oz/Week  Comments

 

 Yes      OCCASIONAL









 Sex Assigned at Birth  Date Recorded

 

 Not on file  







Last Filed Vital Signs







 Vital Sign  Reading  Time Taken

 

 Blood Pressure  138/72  2018 10:44 AM CST

 

 Pulse  64  2018 10:44 AM CST

 

 Temperature  36.1 C (97 F)  2018 10:44 AM CST

 

 Respiratory Rate  14  2017  9:58 AM CST

 

 Oxygen Saturation  97%  2018 10:44 AM CST

 

 Inhaled Oxygen Concentration  -  -

 

 Weight  146 kg (322 lb)  2018 10:44 AM CST

 

 Height  185.4 cm (6' 1")  2017  6:43 AM CST

 

 Body Mass Index  42.48  2018 10:44 AM CST







Plan of Treatment







 Date  Type  Specialty  Care Team  Description

 

 2018  Office Visit  Infectious Diseases  Keena Warner MD



  



       [address]



  



       [address]



  



       [place] [address] [phone] (Fax)  









 Health Maintenance  Due Date  Last Done  Comments

 

 DIABETIC FOOT EXAM  10/31/1982    

 

 DIABETIC RETINAL EYE EXAM  10/31/1982    

 

 INFLUENZA VACCINE  2018    







Procedures







 Procedure Name  Priority  Date/Time  Associated Diagnosis  Comments

 

 HELPER T-LYMPHOCYTE  Routine  2018 12:48    Results for this



 CD4    PM CDT    procedure are in



         the results



         section.

 

 HIV 1 RNA,  Routine  2018 12:48  Human immunodeficiency  Results for this



 QUANTITATIVE REAL    PM CDT  virus I infection



  procedure are in



 TIME PCR      Long term use of drug  the results



         section.

 

 LIPID PANEL  Routine  2018 12:48  Human immunodeficiency  Results for 
this



     PM CDT  virus I infection



  procedure are in



       Long term use of drug  the results



         section.

 

 COMPREHENSIVE  Routine  2018 12:48  Human immunodeficiency  Results for 
this



 METABOLIC PANEL    PM CDT  virus I infection



  procedure are in



       Long term use of drug  the results



         section.

 

 HIV 1 RNA,  Routine  2018 10:58  Human immunodeficiency  Results for this



 QUANTITATIVE REAL    AM CST  virus I infection  procedure are in



 TIME PCR        the results



         section.

 

 HELPER T-LYMPHOCYTE  Routine  01/10/2018 12:00    Results for this



 CD4    AM CST    procedure are in



         the results



         section.

 

 HIV 1 RNA,  Routine  01/10/2018 12:00  HIV disease



  Results for this



 QUANTITATIVE REAL    AM CST  Long term use of drug  procedure are in



 TIME PCR        the results



         section.

 

 LIPID PANEL  Routine  01/10/2018 12:00  HIV disease



  Results for this



     AM CST  Long term use of drug  procedure are in



         the results



         section.

 

 COMPREHENSIVE  Routine  01/10/2018 12:00  HIV disease



  Results for this



 METABOLIC PANEL    AM CST  Long term use of drug  procedure are in



         the results



         section.

 

 COLONOSCOPY    2017  8:30  Colon cancer screening  



     AM CST    

 

 ECG 12-LEAD  STAT  2017  7:26    Results for this



     AM CST    procedure are in



         the results



         section.

 

 POC GLUCOSE  Routine  2017  6:54    Results for this



     AM CST    procedure are in



         the results



         section.



after 2017



Results

[place] T-Lymphocyte CD4 (2018 12:48 PM)Only the most recent of2 
resultswithin the time period is included.





 Component  Value  Ref Range  Performed At

 

 CD4 absolute count  826  359 - 1,519 /uL  [facility]

 

 CD4%  30.6 (L)  30.8 - 58.5 %  [facility]

 

 WBC  6.5  3.4 - 10.8 x10E3/uL  [facility]

 

 RBC  4.98  4.14 - 5.80 x10E6/uL  [facility]

 

 HGB  14.1  13.0 - 17.7 g/dL  [facility]

 

 HCT  46.7  37.5 - 51.0 %  [facility]

 

 MCV  94  79 - 97 fL  [facility]

 

 MCH  28.3  26.6 - 33.0 pg  [facility]

 

 MCHC  30.2 (L)  31.5 - 35.7 g/dL  [facility]

 

 RDW  13.4  12.3 - 15.4 %  [facility]

 

 Platelet count  222  150 - 379 x10E3/uL  [facility]

 

 Neutrophils  52  Not Estab. %  [facility]

 

 Lymphocytes  41  Not Estab. %  [facility]

 

 Monocytes  6  Not Estab. %  [facility]

 

 Eosinophils  1  Not Estab. %  [facility]

 

 Basophils  0  Not Estab. %  [facility]

 

 Neutrophils, absolute  3.4  1.4 - 7.0 x10E3/uL  [facility]

 

 Lymphocytes, absolute  2.7  0.7 - 3.1 x10E3/uL  [facility]

 

 Monocytes, absolute  0.4  0.1 - 0.9 x10E3/uL  [facility]

 

 Eosinophils, absolute  0.1  0.0 - 0.4 x10E3/uL  [facility]

 

 Basophils, absolute  0.0  0.0 - 0.2 x10E3/uL  [facility]

 

 Immature granulocytes  0  Not Estab. %  [facility]

 

 Immature grans (abs)  0.0  0.0 - 0.1 x10E3/uL  [facility]









 Narrative  Performed At

 

 Performed at: - [facility]



  [facility]



 [address], [id number]



  



 : Manish Deleon MD, Phone:[phone]  









 Performing Organization  Address  City/State/Zipcode  Phone Number

 

 [facility]      



HIV 1 RNA, QUANTITATIVE REAL TIME PCR (2018 12:48 PM)Only the most recent 
of3 resultswithin the time period is included.





 Component  Value  Ref Range  Performed At

 

 HIV-1 RNA by PCR, Qn  <20  copies/mL  [facility]



   Comment:    



   HIV-1 RNA not detected    



   The reportable range for this assay is 20 to 10,000,000    



   copies HIV-1 RNA/mL.    



       

 

 HIV-1 RNA viral load log  CANCELED  [id number]/mL  [facility]



   Comment:    



   Unable to calculate result since non-numeric result obtained for    



   component test.    



       



   Result canceled by the ancillary    



       









 Narrative  Performed At

 

 Performed at:01 - [facility]



  [facility]



 [address], [id number]



  



 : Mckinley Robles MD, Phone:[phone]  









 Performing Organization  Address  City[place]  Phone Number

 

 [facility]      



Lipid panel (2018 12:48 PM)Only the most recent of2 resultswithin the 
time period is included.





 Component  Value  Ref Range  Performed At

 

 Cholesterol  143  100 - 199 mg/dL  [facility]

 

 Triglycerides  55  0 - 149 mg/dL  [facility]

 

 HDL cholesterol  18 (L)  >39 mg/dL  [facility]

 

 VLDL cholesterol miley  11  5 - 40 mg/dL  [facility]

 

 LDL cholesterol calculated  114 (H)  0 - 99 mg/dL  [facility]

 

 Non-HDL cholesterol  125  0 - 129 mg/dL  [facility]









 Specimen

 

 Blood









 Narrative  Performed At

 

 Performed at:01 - [facility]



  [facility]



 [address], [id number]



  



 : Manish Deleon MD, Phone:[phone]  









 Performing Organization  Address  City[place]  Phone Number

 

 [facility]      



Comprehensive metabolic panel (2018 12:48 PM)Only the most recent of2 
resultswithin the time period is included.





 Component  Value  Ref Range  Performed At

 

 Glucose  93  65 - 99 mg/dL  [facility]

 

 BUN, whole blood  12  6 - 24 mg/dL  [facility]

 

 Creatinine  1.01  0.76 - 1.27 mg/dL  [facility]

 

 EGFR Non-Afr. American  89  >59 mL/min/1.73  [facility]

 

 EGFR African American  103  >59 mL/min/1.73  [facility]

 

 BUN/creatinine ratio  12  9 - 20  [facility]

 

 Sodium  143  134 - 144 mmol/L  [facility]

 

 Potassium  4.2  3.5 - 5.2 mmol/L  [facility]

 

 Chloride  105  96 - 106 mmol/L  [facility]

 

 CO2  23Comment:  20 - 29 mmol/L  [facility]



   **Please note reference    



   interval change**    

 

 Calcium  9.3  8.7 - 10.2 mg/dL  [facility]

 

 Protein  7.7  6.0 - 8.5 g/dL  [facility]

 

 Albumin, S  4.5  3.5 - 5.5 g/dL  [facility]

 

 Globulin, total  3.2  1.5 - 4.5 g/dL  [facility]

 

 Albumin/globulin ratio  1.4  1.2 - 2.2  [facility]

 

 Total bilirubin  0.2  0.0 - 1.2 mg/dL  [facility]

 

 Alkaline phosphatase  88  39 - 117 IU/L  [facility]

 

 AST  22  0 - 40 IU/L  [facility]

 

 ALT  23  0 - 44 IU/L  [facility]









 Specimen

 

 Blood









 Narrative  Performed At

 

 Performed at:01 - [facility]



  [facility]



 [address], [id number]



  



 : Manish Deleon MD, Phone:[phone]  









 Performing Organization  Address  [facility]  Phone Number

 

 [facility]      



ECG 12 lead (2017  7:26 AM)





 Component  Value  Ref Range  Performed At

 

 Ventricular rate  63    [facility]

 

 Atrial rate  63    [facility]

 

 PA interval  168    [facility]

 

 QRSD interval  86    [facility]

 

 QT interval  432    [facility]

 

 QTC interval  442    [facility]

 

 P axis 1  32    [facility]

 

 QRS axis 1  40    [facility]

 

 T wave axis  68    [facility]

 

 EKG impression  Sinus rhythm with occasional premature ventricular complexes-
RSR' or QR pattern in V1 suggests right ventricular conduction delay-
Nonspecific ST and T wave abnormality-Abnormal ECG-No previous ECGs avai    [facility] 
[facility]



   lable-Electronically Signed By Que Yancey MD    



   ([id number]) on 2017 1:26:06 PM    



       









 Performing Organization  Address  City[facility]  Phone Number

 

 [facility] M[facility]  [address] [place] [address]  



POC glucose (2017  6:54 AM)





 Component  Value  Ref Range  Performed At

 

 POC glucose  84  65 - 99 mg/dL  [facility] DEPARTMENT OF PATHOLOGY



   Comment:    [facility]



   Meter ID: [id number]    



   : Winston RICO    



       









 Performing Organization  Address  City/State/Zipcode  Phone Number

 

 [facility] DEPARTMENT OF PATHOLOGY  [address] [place] [address]  



 [facility]      



after 2017



Insurance







 Payer  Benefit Plan / Group  Subscriber ID  Type  Phone  Address

 

 [facility] CHOICE/CHOICE +  xxxxxxxxx  HMO/PPO    









 Guarantor Name  Account Type  Relation to  Date of  Phone  Billing



     Patient  Birth    Address

 

 TERRY GARCIA  Personal/Family  Self  1970  Home:  [address] [place] [address]



 AND         [phone]  [phone]







         [address] [place] [address]

## 2018-07-15 NOTE — P.HP
Certification for Inpatient


Patient admitted to: Observation


With expected LOS: <2 Midnights


Patient will require the following post-hospital care: None


Practitioner: I am a practitioner with admitting privileges, knowledge of 

patient current condition, hospital course, and medical plan of care.


Services: Services provided to patient in accordance with Admission 

requirements found in Title 42 Section 412.3 of the Code of Federal Regulations





Patient History


Date of Service: 07/15/18


Primary Care Provider: COYOT


Reason for admission: Chest pain


History of Present Illness: 





47 hy/o M with h/o of Drug abuse, HIV, Vaping, HTN, HLP and PE who presented to 

the hospital for Chest Discomfort. Pt states he [place] 

this AM and started feeling like there was "elephant" sitting on his chest and 

got nauseous and came to the ER. Pt was seen in the ER 1 month ago for similar 

complains and was discharge home with diagnosis of panic attack and outpt f.u 

with Cardiology. He states he was doing well and did not followup with 

Cardiology. He also states that he is having a lot of anxiety lately and 

recently was started on Xanax at bedside in addition to his other anxiety 

Medication. Pt states he denies SOB, radiation of the pain, Dizziness or any 

other complains to offer. Pt states he vapes occasionally and drinks 

occasionally as well. No other complains to offer. 


Allergies





milk Allergy (Severe, Verified 07/15/18 12:35)


 Nausea/Vomiting


morphine Allergy (Severe, Verified 07/15/18 12:35)


 Itching/Hives/Rash





Home Medications: 








ALPRAZolam [Xanax] 0.25 mg PO Q4H PRN 07/15/18 


Amlodipine Besylate/Benazepril [Lotrel 10-40 mg Capsule] 1 each PO DAILY 07/15/

18 


Buspirone HCl [Buspar] 15 mg PO TID 07/15/18 


Carvedilol [Coreg] 12.5 mg PO BID 07/15/18 


Clonidine HCl [Catapres*] 0.2 mg PO BID 07/15/18 


Clonidine HCl [Catapres] 0.1 mg PO Q4H PRN 07/15/18 


Fluoxetine HCl [Prozac] 40 mg PO DAILY 07/15/18 


Folic Acid 2 mg PO DAILY 07/15/18 


Gemfibrozil [Lopid] 600 mg PO BID 07/15/18 


Omeprazole 20 mg PO DAILY 07/15/18 


Rivaroxaban [Xarelto] 20 mg PO DAILY 07/15/18 


Tramadol HCl [Ultram] 50 mg PO QID PRN 07/15/18 


Ziprasidone HCl [Geodon*] 40 mg PO BEDTIME 07/15/18 








- Past Medical/Surgical History


Has patient received pneumonia vaccine in the past: Yes


Diabetic: No


-: HTN


-: depression


-: GERD


-: HLD


-: PE- 2002





- Social History


Smoking Status: Never smoker


Alcohol use: Yes


CD- Drugs: No


Caffeine use: Yes


Place of Residence: Home





Review of Systems


General: As per HPI





Physical Examination





- Vital Signs


Temperature: 97.9 F


Blood Pressure: 129/90


Pulse: 59


Respirations: 18





- Physical Exam


General: Alert, In no apparent distress


HEENT: Atraumatic, PERRLA, Mucous membr. moist/pink, EOMI, Sclerae nonicteric


Neck: Supple, 2+ carotid pulse no bruit, No LAD, Without JVD or thyroid 

abnormality


Respiratory: Clear to auscultation bilaterally, Normal air movement


Cardiovascular: Regular rate/rhythm, Normal S1 S2


Gastrointestinal: Normal bowel sounds, No tenderness


Musculoskeletal: No tenderness


Integumentary: No rashes


Neurological: Normal gait, Normal speech, Normal strength at 5/5 x4 extr, 

Normal tone, Normal affect


Lymphatics: No axilla or inguinal lymphadenopathy





- Studies


Laboratory Data (last 24 hrs)





07/15/18 08:30: PT 12.6 H, INR 1.07, APTT 29.4


07/15/18 08:30: WBC 8.8, Hgb 14.2, Hct 44.2, Plt Count 213


07/15/18 08:30: Sodium 143, Potassium 3.6, BUN 17, Creatinine 1.20, Glucose 143 

H, Magnesium 2.1, Total Bilirubin 0.1 L, AST 21, ALT 31, Alkaline Phosphatase 97








Assessment and Plan





- Problems (Diagnosis)


(1) Chest pain


Current Visit: Yes   Status: Acute   


Plan: 


Chest pain most Likely 2.2 to Anxiety but will r/o ACS since pt is high risk 

with Smoking and IV drug abuse 


-Cardiology consulted. 


-ECHO, Stress test in AM 


-ACS medication - Statin, BB and Anticoagulation 





Qualifiers: 


   Chest pain type: unspecified   Qualified Code(s): R07.9 - Chest pain, 

unspecified   





(2) HIV (human immunodeficiency virus infection)


Current Visit: Yes   Status: Chronic   


Plan: 


Last HIV count < 20 and CD 4 count 856 on 7/5/18


-Restart home medication











(3) HTN (hypertension)


Current Visit: Yes   Status: Chronic   


Plan: 


Uncontrolled due to Noncompliance with medication 


-Restart Home medication 





Qualifiers: 


   Hypertension type: essential hypertension   Qualified Code(s): I10 - 

Essential (primary) hypertension   





(4) Anxiety


Current Visit: Yes   Status: Chronic   


Plan: 


On Medication 


Will restart 











(5) Hyperlipidemia


Current Visit: Yes   Status: Chronic   


Plan: 


Lipid Panel WNL 


-Restart home medicaiton 





Qualifiers: 


   Hyperlipidemia type: mixed hyperlipidemia   Qualified Code(s): E78.2 - Mixed 

hyperlipidemia   





(6) Morbid obesity


Current Visit: Yes   Status: Chronic   





(7) Tobacco abuse


Current Visit: Yes   Status: Chronic   





(8) Pulmonary embolism


Current Visit: Yes   Status: Acute   


Plan: 


H/o of PE on Xarelto 


-Continue here 





Qualifiers: 


   Pulmonary embolism type: other   Chronicity: chronic   Acute cor pulmonale 

presence: without acute cor pulmonale   Qualified Code(s): I27.82 - Chronic 

pulmonary embolism   


Discharge Plan: Home


Plan to discharge in: 48 Hours





- Advance Directives


Does patient have a Living Will: No


Does patient have a Durable POA for Healthcare: No





- Code Status/Comfort Care


Code Status Assessed: Yes


Critical Care: No

## 2018-07-16 LAB
BUN BLD-MCNC: 18 MG/DL (ref 7–18)
GLUCOSE SERPLBLD-MCNC: 118 MG/DL (ref 74–106)
HCT VFR BLD CALC: 41.9 % (ref 39.6–49)
LYMPHOCYTES # SPEC AUTO: 2.9 K/UL (ref 0.7–4.9)
MCH RBC QN AUTO: 29.1 PG (ref 27–35)
MCV RBC: 91.6 FL (ref 80–100)
PMV BLD: 9 FL (ref 7.6–11.3)
POTASSIUM SERPL-SCNC: 4.2 MMOL/L (ref 3.5–5.1)
RBC # BLD: 4.57 M/UL (ref 4.33–5.43)

## 2018-07-16 RX ADMIN — GEMFIBROZIL SCH MG: 600 TABLET, FILM COATED ORAL at 10:14

## 2018-07-16 RX ADMIN — Medication SCH ML: at 10:22

## 2018-07-16 RX ADMIN — HUMAN INSULIN SCH: 100 INJECTION, SOLUTION SUBCUTANEOUS at 07:30

## 2018-07-16 RX ADMIN — BUSPIRONE HYDROCHLORIDE SCH MG: 15 TABLET ORAL at 10:22

## 2018-07-16 RX ADMIN — BUSPIRONE HYDROCHLORIDE SCH MG: 15 TABLET ORAL at 13:57

## 2018-07-16 RX ADMIN — CARVEDILOL SCH: 12.5 TABLET, FILM COATED ORAL at 09:00

## 2018-07-16 RX ADMIN — HUMAN INSULIN SCH: 100 INJECTION, SOLUTION SUBCUTANEOUS at 11:21

## 2018-07-16 NOTE — TREADPHA
DX:  CHEST PAIN



Date of Study: 07/16/2018





Ht: 6 0 

Wt:  314 lb 0 oz



Consulting Physician:  PIETER









MEDICATIONS:  TYLENOL, NORVASC, LOTENSIN, COREG, CATAPRES, PROZAC, LOPID, NOVOLIN-R, 
ZOFRAN, XARELTO, ULTRAM.





HISTORY:  47 YEAR OLD MALE WITH COMPAINTS OF CHEST PAIN. MEDICAL HISTORY OF HPERTENSION, 
DIABETES MELLITUS, GERD AND SLEEP APNEA.





PHYSICIAL EXAMINATION: 

            



RESTING B.P.:  139/83

RESTING H.R.:  58





RESTING EKG:  NORMAL



                    

            

PROTOCOL:  LEXISCAN

EXERCISE TIME:  3:30 

B.P. AT PEAK STRESS:  132/88









IMPRESSION:  LEXISCAN INJECTED. CARDIOLITE INJECTED PER PROTOCOL. SEE NUCLEAR MEDICINE 
REPORT. NO CHEST PAIN. PREMATURE VENTRICULAR COMPLEXES NOTEDTHROUGHOUT TEST. NO 
VENTRICULAR TACHYCARDIA. NO SUPRAVENTRICULAR TACHYCARDIA.

## 2018-07-16 NOTE — CON
Identification:  A 47-year-old man.



Chief Complaint:  Chest pressure.



History Of Present Illness:  The patient had chest pressure for about an hour and a half yesterday, c
entral chest, nonradiating, some dyspnea, came to the hospital, and since being here, his EKG is nons
pecific, but not normal, but very similar to older EKGs we had, which shows PVCs and nonspecific T-wa
ve flattening.  No infarction, injury, or ischemia.  All of his cardiac enzymes have been normal.  Th
e patient has never had myocardial infarction or stroke in the past.



Social History:  Uses no tobacco.



Past Medical History:  He has a history of hypertension, dyslipidemia, history of atrial fib.  Past h
istory is also significant for schizophrenia and positive HIV apparently.



Medications:  He is on rivaroxaban.  Other medications are buspirone, folic acid, clonidine, alprazol
am, ziprasidone, omeprazole, carvedilol, amlodipine, benazepril, gemfibrozil, tramadol, and fluoxetin
e.



Allergies:  HE IS ALLERGIC TO MILK, MORPHINE, AND SOME TOPICAL ALLERGIES.



Physical Examination:

Vital Signs:  Height 6 feet tall, 314 pounds.  Alert, oriented, pleasant, not in distress. 

Lungs:  Clear. 

Heart:  Within normal limits. 

Abdomen:  Soft. 

Extremities:  normal.



Impression:  The patient is not having an acute coronary syndrome.  A pharmacologic stress test is un
derway, echo.  If those are normal, he could be discharged for outpatient care.





SHAMAR

DD:  07/16/2018 09:16:41Voice ID:  [id number]

DT:  07/16/2018 11:36:55Report ID:  [id number]

## 2018-07-16 NOTE — P.SSS
Patient History


Date of Service: 07/16/18


Primary Care Provider: HARRIET


Reason for admission: Chest pain


History of Present Illness: 





47 hy/o M with h/o of Drug abuse, HIV, Vaping, HTN, HLP and PE who presented to 

the hospital for Chest Discomfort. Pt states he was [place] 

this AM and started feeling like there was "elephant" sitting on his chest and 

got nauseous and came to the ER. Pt was seen in the ER 1 month ago for similar 

complains and was discharge home with diagnosis of panic attack and outpt f.u 

with Cardiology. He states he was doing well and did not followup with 

Cardiology. He also states that he is having a lot of anxiety lately and 

recently was started on Xanax at bedside in addition to his other anxiety 

Medication. Pt states he denies SOB, radiation of the pain, Dizziness or any 

other complains to offer. Pt states he vapes occasionally and drinks 

occasionally as well. No other complains to offer. 


Allergies





milk Allergy (Severe, Verified 07/15/18 12:35)


 Nausea/Vomiting


morphine Allergy (Severe, Verified 07/15/18 12:35)


 Itching/Hives/Rash





Home Medications: 








ALPRAZolam [Xanax*] 0.25 mg PO Q4H PRN 07/15/18 


Amlodipine Besylate/Benazepril [Lotrel 10-40 mg Capsule] 1 each PO DAILY 07/15/

18 


Buspirone HCl [Buspar*] 15 mg PO TID 07/15/18 


Carvedilol [Coreg*] 12.5 mg PO BID 07/15/18 


Clonidine HCl [Catapres*] 0.2 mg PO BID 07/15/18 


Clonidine HCl [Catapres] 0.1 mg PO Q4H PRN 07/15/18 


Fluoxetine HCl [Prozac*] 40 mg PO DAILY 07/15/18 


Folic Acid 2 mg PO DAILY 07/15/18 


Gemfibrozil [Lopid*] 600 mg PO BID 07/15/18 


Omeprazole 20 mg PO DAILY 07/15/18 


Rivaroxaban [Xarelto*] 20 mg PO DAILY 07/15/18 


Tramadol HCl [Ultram] 50 mg PO QID PRN 07/15/18 


Ziprasidone HCl [Geodon*] 40 mg PO BEDTIME 07/15/18 








- Past Medical/Surgical History


Has patient received pneumonia vaccine in the past: Yes


Diabetic: No


-: HTN


-: depression


-: GERD


-: HLD


-: PE- 2002





- Social History


Smoking Status: Never smoker


Alcohol use: Yes


CD- Drugs: No


Caffeine use: Yes


Place of Residence: Home





Review of Systems


General: As per HPI





Physical Examination





- Vital Signs


Temperature: 97.0 F


Blood Pressure: 153/93


Pulse: 62


Respirations: 18


Pulse Ox (%): 97





- Physical Exam


General: Alert, In no apparent distress


HEENT: Atraumatic, PERRLA, Mucous membr. moist/pink, EOMI, Sclerae nonicteric


Neck: Supple, 2+ carotid pulse no bruit, No LAD, Without JVD or thyroid 

abnormality


Respiratory: Clear to auscultation bilaterally, Normal air movement


Cardiovascular: Regular rate/rhythm, Normal S1 S2


Gastrointestinal: Normal bowel sounds, No tenderness


Musculoskeletal: No tenderness


Integumentary: No rashes


Neurological: Normal gait, Normal speech, Normal strength at 5/5 x4 extr, 

Normal tone, Normal affect


Lymphatics: No axilla or inguinal lymphadenopathy





- Diagnosis (Problem(s))


(1) Chest pain


Onset Date: 07/16/18   Current Visit: Yes   Status: Acute   


Plan: 


Chest pain most Likely 2.2 to Anxiety. ACS ruled out   


-Cardiology consulted. 


-ECHO, Stress test WNL 





Qualifiers: 


   Chest pain type: unspecified   Qualified Code(s): R07.9 - Chest pain, 

unspecified   





(2) HIV (human immunodeficiency virus infection)


Onset Date: 07/16/18   Current Visit: Yes   Status: Chronic   


Plan: 


Last HIV count < 20 and CD 4 count 856 on 7/5/18














(3) HTN (hypertension)


Onset Date: 07/16/18   Current Visit: Yes   Status: Chronic   


Qualifiers: 


   Hypertension type: essential hypertension   Qualified Code(s): I10 - 

Essential (primary) hypertension   





(4) Anxiety


Onset Date: 07/16/18   Current Visit: Yes   Status: Chronic   





(5) Hyperlipidemia


Onset Date: 07/16/18   Current Visit: Yes   Status: Chronic   


Qualifiers: 


   Hyperlipidemia type: mixed hyperlipidemia   Qualified Code(s): E78.2 - Mixed 

hyperlipidemia   





(6) Morbid obesity


Onset Date: 07/16/18   Current Visit: Yes   Status: Chronic   





(7) Tobacco abuse


Onset Date: 07/16/18   Current Visit: Yes   Status: Chronic   





(8) Pulmonary embolism


Onset Date: 07/16/18   Current Visit: Yes   Status: Acute   


Qualifiers: 


   Pulmonary embolism type: other   Chronicity: chronic   Acute cor pulmonale 

presence: without acute cor pulmonale   Qualified Code(s): I27.82 - Chronic 

pulmonary embolism   





- Disposition


Disposition: ROUTINE DISCHARGE


Condition: GOOD


Patient Discharge Instructions: please f.u with PCP in 1 to 2 week post 

discharge.  No new medication


Diet: Regular


Activity: Ad malcolm

## 2018-07-16 NOTE — EKG
Test Date:    2018-07-15               Test Time:    08:13:05

Technician:   TEJINDER                                    

                                                     

MEASUREMENT RESULTS:                                       

Intervals:                                           

Rate:         73                                     

KY:           172                                    

QRSD:         94                                     

QT:           416                                    

QTc:          458                                    

Axis:                                                

P:            12                                     

KY:           172                                    

QRS:          22                                     

T:            30                                     

                                                     

INTERPRETIVE STATEMENTS:                                       

                                                     

Sinus rhythm with occasional premature ventricular complexes

Possible Left atrial enlargement

Nonspecific T wave abnormality

Abnormal ECG

No previous ECG available for comparison



Electronically Signed On 07-16-18 08:56:43 CDT by Guzman Mcbride

## 2018-07-16 NOTE — RAD REPORT
EXAM DESCRIPTION:  [facility] - Rest Stress Cardiac Imaging - 7/16/2018 10:24 am

 

CLINICAL HISTORY:  Chest pain

 

COMPARISON:  None.

 

TECHNIQUE:  The patient was administered 10.7 mCi of Tc 99m Sestamibi prior to resting SPECT imaging 
of the heart. The patient was then administered approximately 30 mCi of Tc 99m Sestamibi following ex
ercise or pharmacologic stress. Multiplanar SPECT images were reviewed.

 

FINDINGS:  The end diastolic volume is 180 ml, the end systolic volume is 91 ml, and the ejection fra
ction is 49 %.

 

Physiologic distribution of the radiopharmaceutical through the myocardium is noted. No stress induce
d ischemic defect is seen to suggest stress induced ischemia. No fixed defect is seen to suggest hibe
rnating myocardium or scarred myocardium.

 

IMPRESSION:  No stress induced ischemia or other suspicious findings.

 

End-diastolic volume is enlarged at 180 milliliters with a slightly below normal EF of 49%.

## 2018-07-16 NOTE — ECHO
HEIGHT: 6 ft 0 in   WEIGHT: 314 lb 0 oz   DATE OF STUDY: 07/16/2018   REFER DR: Fouzia Medina MD

2-DIMENSIONAL: YES

     M.MODE: YES

 DOPPLER: YES

COLOR FLOW: YES



                    TDS:  NO

PORTABLE: NO

 DEFINITY:  NO

BUBBLE STUDY: NO





DIAGNOSIS:  ACS



CARDIAC HISTORY:  

CATHERIZATION: NO

SURGERY: NO

PROSTHETIC VALVE: NO

PACEMAKER: NO





MEASUREMENTS (cm)

    DIASTOLIC (NORMALS)                 SYSTOLIC (NORMALS)

IVSd                 1.3 (0.6-1.2)                    LA Diam 4.9 (1.9-4.0)     LVEF       
  55%  

LVIDd               4.7 (3.5-5.7)                        LVIDs      3.4 (2.0-3.5)     %FS  
        28%

LVPWd             1.4 (0.6-1.2)

Ao Diam           3.0 (2.0-3.7)



2 DIMENSIONAL ASSESSMENT:

RIGHT ATRIUM:                   NORMAL

LEFT ATRIUM:       DILATED



RIGHT VENTRICLE:            NORMAL

LEFT VENTRICLE: LEFT VENTRICULAR HYPERTROPHY, CONCENTRIC



TRICUSPID VALVE:             NORMAL

MITRAL VALVE:     NORMAL



PULMONIC VALVE:             NORMAL

AORTIC VALVE:     NORMAL



PERICARDIAL EFFUSION: NONE

AORTIC ROOT:      NORMAL





LEFT VENTRICULAR WALL MOTION:     NORMAL



DOPPLER/COLOR FLOW:     MILD MITRAL REGURGITATION. TRACE REGURGITATION. NORMAL RIGHT 
VENTRICULAR SYSTOLIC PRESSURE. 



COMMENTS:      NORMAL LEFT VENTRICULAR EJECTION FRACTION. LEFT VENTRICULAR HYPERTROPHY. 
DILATED LEFT ATRIUM. MILD MITRAL REGURGITATION. TRACE REGURGITATION.



TECHNOLOGIST:   CARRIE ELIZONDO